# Patient Record
Sex: MALE | NOT HISPANIC OR LATINO | Employment: FULL TIME | URBAN - METROPOLITAN AREA
[De-identification: names, ages, dates, MRNs, and addresses within clinical notes are randomized per-mention and may not be internally consistent; named-entity substitution may affect disease eponyms.]

---

## 2020-05-08 ENCOUNTER — APPOINTMENT (OUTPATIENT)
Dept: GENERAL RADIOLOGY | Facility: HOSPITAL | Age: 50
End: 2020-05-08

## 2020-05-08 ENCOUNTER — HOSPITAL ENCOUNTER (EMERGENCY)
Facility: HOSPITAL | Age: 50
Discharge: HOME | End: 2020-05-08
Attending: EMERGENCY MEDICINE | Admitting: EMERGENCY MEDICINE

## 2020-05-08 VITALS
RESPIRATION RATE: 16 BRPM | WEIGHT: 138.89 LBS | HEIGHT: 67 IN | BODY MASS INDEX: 21.8 KG/M2 | OXYGEN SATURATION: 95 % | TEMPERATURE: 98.6 F | DIASTOLIC BLOOD PRESSURE: 91 MMHG | HEART RATE: 96 BPM | SYSTOLIC BLOOD PRESSURE: 158 MMHG

## 2020-05-08 DIAGNOSIS — R05.9 COUGH: Primary | ICD-10-CM

## 2020-05-08 PROCEDURE — 99282 EMERGENCY DEPT VISIT SF MDM: CPT

## 2020-05-08 PROCEDURE — 99281 EMR DPT VST MAYX REQ PHY/QHP: CPT

## 2020-05-08 PROCEDURE — 71045 X-RAY EXAM CHEST 1 VIEW: CPT

## 2020-05-08 NOTE — ED NOTES
Used the ipad  and pt states he doesn't know why he is here, denies pain, SOA,. States that he has had a cough for a couple days.     Cheryl Vogel, BARRETT  05/08/20 [id number]

## 2020-05-08 NOTE — ED TRIAGE NOTES
Pt reports headache and cough x3 days. Pt speaks Thai.      Pt was placed in a mask upon arrival to er.  This RN wore PPE.

## 2020-05-08 NOTE — ED PROVIDER NOTES
EMERGENCY DEPARTMENT ENCOUNTER    Room Number:  33/33  Date of encounter:  5/8/2020  PCP: Provider, No Known  Historian: Patient     I used full protective equipment while examining this patient.  This includes face mask, gloves and protective eyewear.  I washed my hands before entering the room and immediately upon leaving the room.  Patient was wearing a surgical mask.      HPI:  Chief Complaint: Cough  A complete HPI/ROS/PMH/PSH/SH/FH are unobtainable due to: None    Patient does speak some English.   service was used as well.    Context: Carmen Bateman is a 49 y.o. male who presents to the ED c/o nonproductive cough since yesterday.  He denies shortness of breath, chest pain, fever, sore throat, runny nose, headache, abdominal pain, nausea, vomiting, diarrhea, or known exposure to anyone testing positive COVID-19.  Nothing makes his symptoms better or worse.  Symptoms are mild.        PAST MEDICAL HISTORY  Active Ambulatory Problems     Diagnosis Date Noted   • No Active Ambulatory Problems     Resolved Ambulatory Problems     Diagnosis Date Noted   • No Resolved Ambulatory Problems     No Additional Past Medical History         PAST SURGICAL HISTORY  History reviewed. No pertinent surgical history.      FAMILY HISTORY  History reviewed. No pertinent family history.      SOCIAL HISTORY  Social History     Socioeconomic History   • Marital status: Single     Spouse name: Not on file   • Number of children: Not on file   • Years of education: Not on file   • Highest education level: Not on file   Tobacco Use   • Smoking status: Current Every Day Smoker     Types: Cigarettes   Substance and Sexual Activity   • Drug use: Never         ALLERGIES  Patient has no known allergies.       REVIEW OF SYSTEMS  Review of Systems      All systems have been reviewed and are negative except as as discussed in the HPI    PHYSICAL EXAM    I have reviewed the triage vital signs and nursing notes.    ED Triage Vitals    Temp Heart Rate Resp BP SpO2   05/08/20 1817 05/08/20 1817 05/08/20 1817 05/08/20 1840 05/08/20 1817   98.6 °F (37 °C) 106 18 158/91 98 %      Temp src Heart Rate Source Patient Position BP Location FiO2 (%)   05/08/20 1817 05/08/20 1817 05/08/20 1840 05/08/20 1840 --   Tympanic Monitor Lying Right arm        Physical Exam  GENERAL: Patient is well-appearing.  He is awake and alert.  HENT: NCAT, nares patent, moist mucous membranes, oropharynx is benign  NECK: supple, no lymphadenopathy  EYES: no scleral icterus  CV: regular rhythm, regular rate, no murmur  RESPIRATORY: normal effort, clear to auscultation bilaterally  ABDOMEN: soft, nontender, nondistended  MUSCULOSKELETAL: no deformity, normal range of motion, no calf tenderness, no pedal edema  NEURO: Strength, sensation, and coordination are grossly intact.  Speech and mentation are unremarkable.  No facial droop.  SKIN: warm, dry, no rash  PSYCH: Normal mood and affect      LAB RESULTS  No results found for this or any previous visit (from the past 24 hour(s)).    Ordered the above labs and independently reviewed the results.      RADIOLOGY  Xr Chest 1 View    Result Date: 5/8/2020  PORTABLE AP CHEST  HISTORY: Cough.  COMPARISON: None.  FINDINGS: Heart size is within normal limits. Within the right midlung there is a very faint subtle area of increased density that could represent a very subtle infiltrate. Mild hazy opacity overlies the lower lobes that is attributed to superimposed soft tissue density. No focal dense airspace disease demonstrated and there is no evidence for pulmonary edema or pleural effusion.      Within the right midlung there is an approximate 2.7 cm very faint area of increased density that could represent a very subtle infiltrate. Recommend follow-up with PA and lateral chest.        I ordered the above noted radiological studies. Reviewed by me and discussed with radiologist.  See dictation for official radiology  interpretation.      PROCEDURES  Procedures      MEDICATIONS GIVEN IN ER    Medications - No data to display      PROGRESS, DATA ANALYSIS, CONSULTS, AND MEDICAL DECISION MAKING    All labs have been independently reviewed by me.  All radiology studies have been reviewed by me and discussed with radiologist dictating the report.   EKG's independently viewed and interpreted by me.  I have reviewed the nurse's notes, vital signs, past medical history, and medication list.  Discussion below represents my analysis of pertinent findings related to patient's condition, differential diagnosis, treatment plan and final disposition.      ED Course as of May 08 2011   Fri May 08, 2020   2008 When I went to check on the patient, he was no longer in his room.  Patient eloped from the ED without informing myself or the nursing staff.  Chest x-ray showed a possible subtle infiltrate.  However, the patient was not febrile, hypoxic, or tachypneic and based on his presentation, I doubt he has pneumonia.    [WH]      ED Course User Index  [WH] Bon Chavez MD       AS OF 20:11 VITALS:    BP - 158/91  HR - 96  TEMP - 98.6 °F (37 °C) (Tympanic)  O2 SATS - 95%      DIAGNOSIS  Final diagnoses:   Cough         DISPOSITION  Patient eloped         Bon Chavez MD  05/08/20 2011

## 2020-05-09 NOTE — ED NOTES
This RN was going to check on pt, pt was not in room. Triage reports seeing pt leaving ER. MD Chavez notified.      Filomena Santos RN  05/08/20 2008

## 2020-05-12 ENCOUNTER — HOSPITAL ENCOUNTER (EMERGENCY)
Facility: HOSPITAL | Age: 50
Discharge: HOME | End: 2020-05-12
Attending: EMERGENCY MEDICINE | Admitting: EMERGENCY MEDICINE

## 2020-05-12 ENCOUNTER — APPOINTMENT (OUTPATIENT)
Dept: GENERAL RADIOLOGY | Facility: HOSPITAL | Age: 50
End: 2020-05-12

## 2020-05-12 VITALS
OXYGEN SATURATION: 98 % | DIASTOLIC BLOOD PRESSURE: 85 MMHG | TEMPERATURE: 98.1 F | RESPIRATION RATE: 16 BRPM | BODY MASS INDEX: 21.97 KG/M2 | SYSTOLIC BLOOD PRESSURE: 119 MMHG | HEIGHT: 67 IN | WEIGHT: 140 LBS | HEART RATE: 69 BPM

## 2020-05-12 DIAGNOSIS — Z20.822 SUSPECTED COVID-19 VIRUS INFECTION: Primary | ICD-10-CM

## 2020-05-12 PROCEDURE — 99283 EMERGENCY DEPT VISIT LOW MDM: CPT

## 2020-05-12 PROCEDURE — U0004 COV-19 TEST NON-CDC HGH THRU: HCPCS | Performed by: EMERGENCY MEDICINE

## 2020-05-12 PROCEDURE — 71045 X-RAY EXAM CHEST 1 VIEW: CPT

## 2020-05-12 NOTE — ED PROVIDER NOTES
EMERGENCY DEPARTMENT ENCOUNTER    Room Number:  29/29  Date of encounter:  5/12/2020  PCP: Provider, No Known  Historian: Patient      HPI:  Chief Complaint: Cough  A complete HPI/ROS/PMH/PSH/SH/FH are unobtainable due to: Nothing    Context: Carmen Bateman is a 49 y.o. male who presents to the ED c/o moderate severity, persistent nonproductive cough for the last 3 days.  Patient has not had a fever or shortness of breath.  He has had no chest pain.  He said no other significant symptoms, but he reports that he had a chest x-ray performed about 4 days ago and was advised it was abnormal and that he should come in for further evaluation.  He has had no ill exposures or any known contact with COVID-19    Patient wore surgical mask and I wore full COVID-19 appropriate enhanced PPE for the entire interaction      PAST MEDICAL HISTORY  Active Ambulatory Problems     Diagnosis Date Noted   • No Active Ambulatory Problems     Resolved Ambulatory Problems     Diagnosis Date Noted   • No Resolved Ambulatory Problems     No Additional Past Medical History         PAST SURGICAL HISTORY  History reviewed. No pertinent surgical history.      FAMILY HISTORY  History reviewed. No pertinent family history.      SOCIAL HISTORY  Social History     Socioeconomic History   • Marital status: Single     Spouse name: Not on file   • Number of children: Not on file   • Years of education: Not on file   • Highest education level: Not on file   Tobacco Use   • Smoking status: Current Every Day Smoker     Types: Cigarettes   Substance and Sexual Activity   • Drug use: Never         ALLERGIES  Patient has no known allergies.        REVIEW OF SYSTEMS  Review of Systems     All systems reviewed and negative except for those discussed in HPI.       PHYSICAL EXAM    I have reviewed the triage vital signs and nursing notes.    ED Triage Vitals [05/12/20 1525]   Temp Heart Rate Resp BP SpO2   98.1 °F (36.7 °C) 83 15 -- 98 %      Temp src Heart  Rate Source Patient Position BP Location FiO2 (%)   Tympanic Monitor -- -- --       Physical Exam  GENERAL: not distressed  HENT: nares patent  EYES: no scleral icterus  CV: regular rhythm, regular rate  RESPIRATORY: normal effort, CTA bilaterally  ABDOMEN: soft  MUSCULOSKELETAL: no deformity  NEURO: alert, moves all extremities, follows commands  SKIN: warm, dry        LAB RESULTS  No results found for this or any previous visit (from the past 24 hour(s)).    Ordered the above labs and independently reviewed the results.        RADIOLOGY  Xr Chest 1 View    Result Date: 5/12/2020  ONE VIEW PORTABLE CHEST  HISTORY: Cough. Possible COVID-19 infection.  FINDINGS: The lungs are well-expanded and clear except for a very minimal area of somewhat vague increased density in the right mid lung as also described on the recent chest x-ray performed 4 days ago. This could represent a small area of minimal developing pneumonia. The heart size remains normal.  This report was finalized on 5/12/2020 6:49 PM by Dr. Sukhi Juarez M.D.        I ordered the above noted radiological studies. Reviewed by me and discussed with radiologist.  See dictation for official radiology interpretation.      PROCEDURES    Procedures      MEDICATIONS GIVEN IN ER    Medications - No data to display      PROGRESS, DATA ANALYSIS, CONSULTS, AND MEDICAL DECISION MAKING    All labs have been independently reviewed by me.  All radiology studies have been reviewed by me and discussed with radiologist dictating the report.   EKG's independently viewed and interpreted by me.  Discussion below represents my analysis of pertinent findings related to patient's condition, differential diagnosis, treatment plan and final disposition.        AS OF 19:03 VITALS:    BP - 119/85  HR - 69  TEMP - 98.1 °F (36.7 °C) (Tympanic)  O2 SATS - 98%        DIAGNOSIS  Final diagnoses:   Suspected Covid-19 Virus Infection         DISPOSITION  Discharge           Hussain Dutton,  MD  05/12/20 [id number]

## 2020-05-12 NOTE — ED NOTES
Patient masked in triage and taken to  Internal waiting room.    Cough x 1 week and abnormal chest xray at PCP.     Mk Parmar RN  05/12/20 [id number]

## 2020-05-13 ENCOUNTER — PATIENT OUTREACH (OUTPATIENT)
Dept: CASE MANAGEMENT | Facility: OTHER | Age: 50
End: 2020-05-13

## 2020-05-13 LAB
REF LAB TEST METHOD: NORMAL
SARS-COV-2 RNA RESP QL NAA+PROBE: NOT DETECTED

## 2020-05-13 NOTE — OUTREACH NOTE
ED Potential Covid Discharge Follow-up    Call with patient was very short, he voiced understanding of CDC COVID 19 recommendations for isolation and use of mask. Pt. Thanked me for call and disconnected.     Prabha Barber RN  Ambulatory     5/13/2020, 12:43

## 2020-05-21 ENCOUNTER — EPISODE CHANGES (OUTPATIENT)
Dept: CASE MANAGEMENT | Facility: OTHER | Age: 50
End: 2020-05-21